# Patient Record
Sex: FEMALE | Race: BLACK OR AFRICAN AMERICAN | NOT HISPANIC OR LATINO | Employment: OTHER | URBAN - METROPOLITAN AREA
[De-identification: names, ages, dates, MRNs, and addresses within clinical notes are randomized per-mention and may not be internally consistent; named-entity substitution may affect disease eponyms.]

---

## 2021-07-16 PROCEDURE — 80047 BASIC METABLC PNL IONIZED CA: CPT

## 2021-07-16 PROCEDURE — 99284 EMERGENCY DEPT VISIT MOD MDM: CPT | Mod: 25

## 2021-07-16 PROCEDURE — 99284 PR EMERGENCY DEPT VISIT,LEVEL IV: ICD-10-PCS | Mod: ,,, | Performed by: EMERGENCY MEDICINE

## 2021-07-16 PROCEDURE — 99284 EMERGENCY DEPT VISIT MOD MDM: CPT | Mod: ,,, | Performed by: EMERGENCY MEDICINE

## 2021-07-17 ENCOUNTER — HOSPITAL ENCOUNTER (EMERGENCY)
Facility: HOSPITAL | Age: 66
Discharge: HOME | End: 2021-07-17
Attending: EMERGENCY MEDICINE
Payer: MEDICARE

## 2021-07-17 VITALS
SYSTOLIC BLOOD PRESSURE: 152 MMHG | HEART RATE: 83 BPM | WEIGHT: 223 LBS | DIASTOLIC BLOOD PRESSURE: 69 MMHG | HEIGHT: 63 IN | OXYGEN SATURATION: 99 % | BODY MASS INDEX: 39.51 KG/M2 | RESPIRATION RATE: 18 BRPM | TEMPERATURE: 99 F

## 2021-07-17 DIAGNOSIS — N30.90 CYSTITIS: ICD-10-CM

## 2021-07-17 DIAGNOSIS — R31.0 GROSS HEMATURIA: Primary | ICD-10-CM

## 2021-07-17 LAB
BACTERIA #/AREA URNS AUTO: ABNORMAL /HPF
BILIRUB UR QL STRIP: NEGATIVE
BUN SERPL-MCNC: 20 MG/DL (ref 6–30)
CHLORIDE SERPL-SCNC: 105 MMOL/L (ref 95–110)
CLARITY UR REFRACT.AUTO: ABNORMAL
COLOR UR AUTO: ABNORMAL
CREAT SERPL-MCNC: 1 MG/DL (ref 0.5–1.4)
GLUCOSE SERPL-MCNC: 122 MG/DL (ref 70–110)
GLUCOSE UR QL STRIP: NEGATIVE
HCT VFR BLD CALC: 38 %PCV (ref 36–54)
HGB UR QL STRIP: ABNORMAL
HYALINE CASTS UR QL AUTO: 0 /LPF
KETONES UR QL STRIP: NEGATIVE
LEUKOCYTE ESTERASE UR QL STRIP: NEGATIVE
MICROSCOPIC COMMENT: ABNORMAL
NITRITE UR QL STRIP: NEGATIVE
PH UR STRIP: 8 [PH] (ref 5–8)
POC IONIZED CALCIUM: 1.2 MMOL/L (ref 1.06–1.42)
POC TCO2 (MEASURED): 24 MMOL/L (ref 23–29)
POTASSIUM BLD-SCNC: 4.8 MMOL/L (ref 3.5–5.1)
PROT UR QL STRIP: ABNORMAL
RBC #/AREA URNS AUTO: >100 /HPF (ref 0–4)
SAMPLE: ABNORMAL
SODIUM BLD-SCNC: 139 MMOL/L (ref 136–145)
SP GR UR STRIP: 1.02 (ref 1–1.03)
SQUAMOUS #/AREA URNS AUTO: 1 /HPF
URN SPEC COLLECT METH UR: ABNORMAL
WBC #/AREA URNS AUTO: 3 /HPF (ref 0–5)

## 2021-07-17 PROCEDURE — 81001 URINALYSIS AUTO W/SCOPE: CPT | Performed by: EMERGENCY MEDICINE

## 2021-07-17 RX ORDER — PANTOPRAZOLE SODIUM 20 MG/1
20 TABLET, DELAYED RELEASE ORAL DAILY
COMMUNITY

## 2021-07-17 RX ORDER — METFORMIN HYDROCHLORIDE 1000 MG/1
1000 TABLET ORAL 2 TIMES DAILY WITH MEALS
COMMUNITY

## 2021-07-17 RX ORDER — HYDROCHLOROTHIAZIDE 25 MG/1
25 TABLET ORAL DAILY
COMMUNITY

## 2021-07-17 RX ORDER — SULFAMETHOXAZOLE AND TRIMETHOPRIM 800; 160 MG/1; MG/1
1 TABLET ORAL 2 TIMES DAILY
Qty: 10 TABLET | Refills: 0 | Status: SHIPPED | OUTPATIENT
Start: 2021-07-17 | End: 2021-07-22

## 2021-07-17 RX ORDER — LISINOPRIL 40 MG/1
40 TABLET ORAL DAILY
COMMUNITY

## 2021-07-17 RX ORDER — ASPIRIN 81 MG/1
81 TABLET ORAL DAILY
COMMUNITY

## 2025-01-09 ENCOUNTER — HOSPITAL ENCOUNTER (EMERGENCY)
Facility: HOSPITAL | Age: 70
Discharge: HOME | End: 2025-01-09
Attending: EMERGENCY MEDICINE
Payer: MEDICARE

## 2025-01-09 VITALS
RESPIRATION RATE: 18 BRPM | WEIGHT: 223 LBS | OXYGEN SATURATION: 99 % | HEIGHT: 63 IN | DIASTOLIC BLOOD PRESSURE: 72 MMHG | SYSTOLIC BLOOD PRESSURE: 176 MMHG | BODY MASS INDEX: 39.51 KG/M2 | HEART RATE: 78 BPM | TEMPERATURE: 98 F

## 2025-01-09 DIAGNOSIS — R07.89 ATYPICAL CHEST PAIN: Primary | ICD-10-CM

## 2025-01-09 DIAGNOSIS — I10 HYPERTENSION, UNSPECIFIED TYPE: ICD-10-CM

## 2025-01-09 DIAGNOSIS — R07.9 CHEST PAIN: ICD-10-CM

## 2025-01-09 LAB
ALBUMIN SERPL BCP-MCNC: 4 G/DL (ref 3.5–5.2)
ALP SERPL-CCNC: 81 U/L (ref 40–150)
ALT SERPL W/O P-5'-P-CCNC: 9 U/L (ref 10–44)
ANION GAP SERPL CALC-SCNC: 10 MMOL/L (ref 8–16)
AST SERPL-CCNC: 14 U/L (ref 10–40)
BASOPHILS # BLD AUTO: 0.03 K/UL (ref 0–0.2)
BASOPHILS NFR BLD: 0.5 % (ref 0–1.9)
BILIRUB SERPL-MCNC: 0.4 MG/DL (ref 0.1–1)
BNP SERPL-MCNC: 20 PG/ML (ref 0–99)
BUN SERPL-MCNC: 17 MG/DL (ref 8–23)
CALCIUM SERPL-MCNC: 9.8 MG/DL (ref 8.7–10.5)
CHLORIDE SERPL-SCNC: 103 MMOL/L (ref 95–110)
CO2 SERPL-SCNC: 24 MMOL/L (ref 23–29)
CREAT SERPL-MCNC: 0.9 MG/DL (ref 0.5–1.4)
D DIMER PPP IA.FEU-MCNC: 0.71 MG/L FEU
DIFFERENTIAL METHOD BLD: ABNORMAL
EOSINOPHIL # BLD AUTO: 0.1 K/UL (ref 0–0.5)
EOSINOPHIL NFR BLD: 2.2 % (ref 0–8)
ERYTHROCYTE [DISTWIDTH] IN BLOOD BY AUTOMATED COUNT: 13.3 % (ref 11.5–14.5)
EST. GFR  (NO RACE VARIABLE): >60 ML/MIN/1.73 M^2
GLUCOSE SERPL-MCNC: 94 MG/DL (ref 70–110)
HCT VFR BLD AUTO: 37.7 % (ref 37–48.5)
HCV AB SERPL QL IA: NORMAL
HGB BLD-MCNC: 11.5 G/DL (ref 12–16)
HIV 1+2 AB+HIV1 P24 AG SERPL QL IA: NORMAL
IMM GRANULOCYTES # BLD AUTO: 0.01 K/UL (ref 0–0.04)
IMM GRANULOCYTES NFR BLD AUTO: 0.2 % (ref 0–0.5)
LYMPHOCYTES # BLD AUTO: 2.5 K/UL (ref 1–4.8)
LYMPHOCYTES NFR BLD: 38.1 % (ref 18–48)
MCH RBC QN AUTO: 26.3 PG (ref 27–31)
MCHC RBC AUTO-ENTMCNC: 30.5 G/DL (ref 32–36)
MCV RBC AUTO: 86 FL (ref 82–98)
MONOCYTES # BLD AUTO: 0.5 K/UL (ref 0.3–1)
MONOCYTES NFR BLD: 7.8 % (ref 4–15)
NEUTROPHILS # BLD AUTO: 3.3 K/UL (ref 1.8–7.7)
NEUTROPHILS NFR BLD: 51.2 % (ref 38–73)
NRBC BLD-RTO: 0 /100 WBC
OHS QRS DURATION: 82 MS
OHS QTC CALCULATION: 404 MS
PLATELET # BLD AUTO: 249 K/UL (ref 150–450)
PMV BLD AUTO: 10.4 FL (ref 9.2–12.9)
POTASSIUM SERPL-SCNC: 4.3 MMOL/L (ref 3.5–5.1)
PROT SERPL-MCNC: 7.6 G/DL (ref 6–8.4)
RBC # BLD AUTO: 4.37 M/UL (ref 4–5.4)
SODIUM SERPL-SCNC: 137 MMOL/L (ref 136–145)
TROPONIN I SERPL DL<=0.01 NG/ML-MCNC: <3 NG/L (ref 0–14)
WBC # BLD AUTO: 6.51 K/UL (ref 3.9–12.7)

## 2025-01-09 PROCEDURE — 80053 COMPREHEN METABOLIC PANEL: CPT | Performed by: EMERGENCY MEDICINE

## 2025-01-09 PROCEDURE — 25000003 PHARM REV CODE 250

## 2025-01-09 PROCEDURE — 94761 N-INVAS EAR/PLS OXIMETRY MLT: CPT

## 2025-01-09 PROCEDURE — 87389 HIV-1 AG W/HIV-1&-2 AB AG IA: CPT | Performed by: PHYSICIAN ASSISTANT

## 2025-01-09 PROCEDURE — 25500020 PHARM REV CODE 255: Performed by: EMERGENCY MEDICINE

## 2025-01-09 PROCEDURE — 84484 ASSAY OF TROPONIN QUANT: CPT | Mod: 91 | Performed by: EMERGENCY MEDICINE

## 2025-01-09 PROCEDURE — 85379 FIBRIN DEGRADATION QUANT: CPT | Performed by: EMERGENCY MEDICINE

## 2025-01-09 PROCEDURE — 84484 ASSAY OF TROPONIN QUANT: CPT | Mod: 91

## 2025-01-09 PROCEDURE — 96374 THER/PROPH/DIAG INJ IV PUSH: CPT

## 2025-01-09 PROCEDURE — 83880 ASSAY OF NATRIURETIC PEPTIDE: CPT | Performed by: EMERGENCY MEDICINE

## 2025-01-09 PROCEDURE — 85025 COMPLETE CBC W/AUTO DIFF WBC: CPT | Performed by: EMERGENCY MEDICINE

## 2025-01-09 PROCEDURE — 93010 ELECTROCARDIOGRAM REPORT: CPT | Mod: ,,, | Performed by: INTERNAL MEDICINE

## 2025-01-09 PROCEDURE — 25000242 PHARM REV CODE 250 ALT 637 W/ HCPCS

## 2025-01-09 PROCEDURE — 93005 ELECTROCARDIOGRAM TRACING: CPT

## 2025-01-09 PROCEDURE — 99285 EMERGENCY DEPT VISIT HI MDM: CPT | Mod: 25

## 2025-01-09 PROCEDURE — 86803 HEPATITIS C AB TEST: CPT | Performed by: PHYSICIAN ASSISTANT

## 2025-01-09 RX ORDER — ALUMINUM HYDROXIDE, MAGNESIUM HYDROXIDE, AND SIMETHICONE 1200; 120; 1200 MG/30ML; MG/30ML; MG/30ML
30 SUSPENSION ORAL
Status: DISCONTINUED | OUTPATIENT
Start: 2025-01-09 | End: 2025-01-09 | Stop reason: HOSPADM

## 2025-01-09 RX ORDER — FAMOTIDINE 10 MG/ML
20 INJECTION INTRAVENOUS
Status: COMPLETED | OUTPATIENT
Start: 2025-01-09 | End: 2025-01-09

## 2025-01-09 RX ORDER — NITROGLYCERIN 0.4 MG/1
0.4 TABLET SUBLINGUAL EVERY 5 MIN PRN
Status: DISCONTINUED | OUTPATIENT
Start: 2025-01-09 | End: 2025-01-09 | Stop reason: HOSPADM

## 2025-01-09 RX ORDER — ASPIRIN 325 MG
325 TABLET ORAL
Status: COMPLETED | OUTPATIENT
Start: 2025-01-09 | End: 2025-01-09

## 2025-01-09 RX ORDER — NITROGLYCERIN 0.4 MG/1
0.4 TABLET SUBLINGUAL
Status: COMPLETED | OUTPATIENT
Start: 2025-01-09 | End: 2025-01-09

## 2025-01-09 RX ADMIN — ASPIRIN 325 MG ORAL TABLET 325 MG: 325 PILL ORAL at 11:01

## 2025-01-09 RX ADMIN — FAMOTIDINE 20 MG: 10 INJECTION INTRAVENOUS at 11:01

## 2025-01-09 RX ADMIN — IOHEXOL 100 ML: 350 INJECTION, SOLUTION INTRAVENOUS at 02:01

## 2025-01-09 RX ADMIN — NITROGLYCERIN 0.4 MG: 0.4 TABLET, ORALLY DISINTEGRATING SUBLINGUAL at 11:01

## 2025-01-09 NOTE — DISCHARGE INSTRUCTIONS
We evaluated in the emergency department for your chest pain.  Your cardiac labs are reassuring at this time.  We did place referral for you to see Cardiology outpatient.  They will call your phone number.  If you do not hear from them, call the circled number attached.     Please return to the emergency department for chest pain that has not relieved with the liver counter medications or your GI medications.

## 2025-01-09 NOTE — PROVIDER PROGRESS NOTES - EMERGENCY DEPT.
ED Attending Hand-off Note    I have discussed the patient's history and presentation in the ED with Kvng Jerome MD    Brief H&P: Patient is a 69 y.o. female who presented to the ED with Chest Pain (Chest pain x2 weeks. Hypertensive in triage SBP 230s)        Pending studies and consultations: Labs    Disposition:  Will continue to monitor, update patient on results of testing and determine appropriate additional treatment for the duration of stay in ED.  Zackary Chappell DO 2:16 PM 1/9/2025        UPDATES:   ED Course as of 01/09/25 1518   Thu Jan 09, 2025   1028 EKG no STEMI [RT]   1141 BP(!): 231/104 [AB]   1226 WBC: 6.51  No leukocytosis  [AB]   1255 Troponin I High Sensitivity: <3 [AB]   1512 Troponin I High Sensitivity  2 troponins greater than 2 hours apart has been obtain during ED course.  According to her HEAR score, given that she has delta troponin within normal limits patient can follow up with Cardiology outpatient for further workup of her symptoms today. [TK]   1513 CTA Chest Non-Coronary (PE Studies)  No evidence of pulmonary embolism [TK]      ED Course User Index  [AB] Kvng Jerome MD  [RT] Sander Garcia MD  [TK] Zackary Chappell DO     At this time given normal troponins after delta troponin greater than 2 hours we will plan for discharge and follow up in Cardiology Clinic.  Shared decision-making at this time regarding this plan.  Patient is preferring follow up with Cardiology outpatient.  No other concerns at this time.  ER return precautions given.        Clinical Impression  :  Chest pain  Atypical chest pain (Primary)       ED Disposition Condition    Discharge Stable          STAFF ATTENDING PHYSICIAN NOTE:  I have individually/jointly evaluated Alexa Terry and discussed their ED management with the resident physician. I have also reviewed their notes, assessments, and procedures documented.  I was present during all critical portions of any procedure(s) performed on Alexa GRACE  Harrison.   ____________________  Esvin MONICA Resendiz MD, FAAEM  Emergency Medicine Staff

## 2025-01-09 NOTE — ED PROVIDER NOTES
Encounter Date: 1/9/2025       History     Chief Complaint   Patient presents with    Chest Pain     Chest pain x2 weeks. Hypertensive in triage SBP 230s     The history is provided by the patient and medical records. No  was used.     Patient is a 69-year-old female with a past medical history of diabetes, hypertension, anemia who presents due to chest pain.  She notes that it started approximately 2-3 weeks ago and has been somewhat persistent/getting worse since that time.  She notes that it was an 8/10 yesterday evening and prevented her from sleep which is what prompted her to come to the emergency department.  She notes that it radiates to the back to a certain degree.  Notes that she had an episode like this 8 years ago, at which time she was given nitroglycerin which improved the pain and subsequently admitted to the hospital.  Has not had any shortness of breath.  Has taken some Tylenol at home which she states moderately helps.  Denies all other symptoms at this time.    Review of patient's allergies indicates:   Allergen Reactions    Ibuprofen      Upset stomach     Past Medical History:   Diagnosis Date    Anemia     Arthritis     both knees    Diabetes mellitus     GERD (gastroesophageal reflux disease)     Hypertension      History reviewed. No pertinent surgical history.  No family history on file.  Social History     Tobacco Use    Smoking status: Never   Substance Use Topics    Alcohol use: Never    Drug use: Never       Physical Exam     Initial Vitals   BP Pulse Resp Temp SpO2   01/09/25 1024 01/09/25 1022 01/09/25 1022 01/09/25 1022 01/09/25 1022   (!) 231/104 94 18 97.5 °F (36.4 °C) 99 %      MAP       --                Physical Exam    Nursing note and vitals reviewed.  Constitutional: She appears well-developed. No distress.   HENT:   Head: Normocephalic and atraumatic. Mouth/Throat: Oropharynx is clear and moist and mucous membranes are normal.   Eyes: EOM are normal.  Pupils are equal, round, and reactive to light.   Neck:   Normal range of motion.  Cardiovascular:  Normal rate, regular rhythm and normal heart sounds.           No murmur heard.  Pulmonary/Chest: Breath sounds normal. No respiratory distress. She has no wheezes. She has no rhonchi. She has no rales.   Abdominal: Abdomen is soft. She exhibits no distension. There is no abdominal tenderness. There is no rebound and no guarding.   Musculoskeletal:      Cervical back: Normal range of motion.      Comments: 1+ pitting edema noted in the bilateral lower extremities     Neurological: She is alert and oriented to person, place, and time.   Skin: Skin is warm.   Psychiatric: She has a normal mood and affect. Her behavior is normal. Thought content normal.         ED Course   Procedures  Labs Reviewed   CBC W/ AUTO DIFFERENTIAL - Abnormal       Result Value    WBC 6.51      RBC 4.37      Hemoglobin 11.5 (*)     Hematocrit 37.7      MCV 86      MCH 26.3 (*)     MCHC 30.5 (*)     RDW 13.3      Platelets 249      MPV 10.4      Immature Granulocytes 0.2      Gran # (ANC) 3.3      Immature Grans (Abs) 0.01      Lymph # 2.5      Mono # 0.5      Eos # 0.1      Baso # 0.03      nRBC 0      Gran % 51.2      Lymph % 38.1      Mono % 7.8      Eosinophil % 2.2      Basophil % 0.5      Differential Method Automated      Narrative:     Release to patient->Immediate   COMPREHENSIVE METABOLIC PANEL - Abnormal    Sodium 137      Potassium 4.3      Chloride 103      CO2 24      Glucose 94      BUN 17      Creatinine 0.9      Calcium 9.8      Total Protein 7.6      Albumin 4.0      Total Bilirubin 0.4      Alkaline Phosphatase 81      AST 14      ALT 9 (*)     eGFR >60.0      Anion Gap 10      Narrative:     Release to patient->Immediate   D DIMER, QUANTITATIVE - Abnormal    D-Dimer 0.71 (*)    HEPATITIS C ANTIBODY    Hepatitis C Ab Non-reactive      Narrative:     Release to patient->Immediate   HIV 1 / 2 ANTIBODY    HIV 1/2 Ag/Ab  Non-reactive      Narrative:     Release to patient->Immediate   TROPONIN I HIGH SENSITIVITY    Troponin I High Sensitivity <3      Narrative:     Release to patient->Immediate   TROPONIN I HIGH SENSITIVITY    Troponin I High Sensitivity <3     B-TYPE NATRIURETIC PEPTIDE    BNP 20      Narrative:     Release to patient->Immediate   TROPONIN I HIGH SENSITIVITY    Troponin I High Sensitivity <3       EKG Readings: (Independently Interpreted)   Initial Reading: No STEMI. Rhythm: Normal Sinus Rhythm. Heart Rate: 81. Ectopy: No Ectopy. Clinical Impression: Normal Sinus Rhythm   No prior for comparison     ECG Results              EKG 12-lead (Final result)        Collection Time Result Time QRS Duration OHS QTC Calculation    01/09/25 10:23:42 01/09/25 11:15:48 82 404                     Final result by Interface, Lab In [facility] (01/09/25 11:15:55)                   Narrative:    Test Reason : R07.9,    Vent. Rate :  81 BPM     Atrial Rate :  81 BPM     P-R Int : 148 ms          QRS Dur :  82 ms      QT Int : 348 ms       P-R-T Axes :  37 -14  14 degrees    QTcB Int : 404 ms    Normal sinus rhythm  Low septal forces ; Abnormal R wave progression in the precordial leads  -Cannot exclude  Anterior infarct ,age undetermined  Abnormal ECG  No previous ECGs available  Confirmed by Alex Hui ([id number]) on 1/9/2025 11:15:47 AM    Referred By:            Confirmed By: Alex Hui                                  Imaging Results              CTA Chest Non-Coronary (PE Studies) (Final result)  Result time 01/09/25 14:51:32      Final result by Carlos Holley MD (01/09/25 14:51:32)                   Impression:      1. No pulmonary embolism is identified.  2. No evidence of acute pulmonary process.  3. Small sliding hiatal hernia.      Electronically signed by: Carlos Lomas  Date:    01/09/2025  Time:    14:51               Narrative:    EXAMINATION:  CTA CHEST NON CORONARY (PE STUDIES)    CLINICAL  HISTORY:  Pulmonary embolism (PE) suspected, positive D-dimer;Chest pain, radiating to the back.  Positive D-dimer;    TECHNIQUE:  CTA of the chest was obtained. Images were reformatted and reviewed in coronal and sagittal planes. Volume-rendered 3D reconstructed images were acquired by post processing for a better visualization of the vascular anomalies, on an independent [employer] workstation with concurrent supervision and archived for permanent records.  Images were acquired during the arterial phase after 100 cc of non ionic intravenous contrast administration.    Suboptimal assessment of the abdominal solid organs due to lack of a venous phase.    COMPARISON:  None.    FINDINGS:  Diagnostic quality: Adequate.    Pulmonary arteries: The pulmonary arteries are well visualized through the segmental level. No pulmonary embolism is identified.    Right heart strain: None.    Chest wall and lower neck: Questionable left thyroid nodule    Lungs and pleura: Hypoventilatory changes.  No consolidation or opacities.    Mediastinum and wendy: No mediastinal or hilar adenopathy.    Vessels: Mild atherosclerotic changes in the aorta and coronary arteries.    Heart and pericardium: Heart size is normal. No pericardial effusion.    Upper abdomen: Small sliding hernia.  Small gallbladder sludge.    Bones: Moderate prominent anterior bridging osteophytes in the thoracic spine.                                       X-Ray Chest AP Portable (Final result)  Result time 01/09/25 12:40:31      Final result by Omi Nagy MD (01/09/25 12:40:31)                   Impression:      No significant intrathoracic abnormality referable to the current history of chest pain is appreciated.      Electronically signed by: Omi Nagy MD  Date:    01/09/2025  Time:    12:40               Narrative:    EXAMINATION:  XR CHEST AP PORTABLE    TECHNIQUE:  One view    COMPARISON:  No prior chest radiographs are currently available for comparison purposes.   Clinical information of chest pain.    FINDINGS:  Allowing for magnification of the cardiomediastinal silhouette related to projection, the true cardiac size is close to the upper limit of normal.  Pulmonary vascularity is normal, and there are no findings indicating current cardiac decompensation.  Lung zones are clear, and are free of significant airspace consolidation or volume loss.  No pleural fluid.  Some tortuosity of the thoracic aorta is noted, but no hilar or mediastinal mass lesion is appreciated.  No pneumothorax.                                       Medications   nitroGLYCERIN SL tablet 0.4 mg (has no administration in time range)   aluminum & magnesium hydroxide-simethicone 400-400-40 mg/5 mL suspension 30 mL (has no administration in time range)   nitroGLYCERIN SL tablet 0.4 mg (0.4 mg Sublingual Given 1/9/25 1150)   famotidine (PF) injection 20 mg (20 mg Intravenous Given 1/9/25 1148)   aspirin tablet 325 mg (325 mg Oral Given 1/9/25 1148)   iohexoL (OMNIPAQUE 350) injection 100 mL (100 mLs Intravenous Given 1/9/25 1426)     Medical Decision Making  Patient is a 69-year-old female who presents due to concerns of chest pain.  Differential diagnosis includes but is not limited to ACS, GERD, PE, pneumothorax, pneumonia, PE, pericarditis, aortic dissection, AAA.  Patient noted to be hypertensive on initial presentation but otherwise had reassuring vital signs.  She endorses that she did take her home blood pressure medications.  In the setting of her being hypertensive as well as having chest pain and an EKG that does not show an inferior STEMI, will give a dose of nitroglycerin for symptomatic control.  We will also perform a cardiac workup and get a D-dimer.    D-dimer found to be slightly elevated.  In the setting of her risk factors, we will get a CTA to further evaluate.  Laboratory workup shown to otherwise be unremarkable.  Upon my re-evaluation of the patient, her blood pressure had improved as  did her pain.      Patient is signed out pending the results of her CTA as well as a 2nd troponin.  Disposition based on the results of the studies.    Amount and/or Complexity of Data Reviewed  External Data Reviewed: notes.     Details: No prior ECHO or stress test noted on record review.   Labs: ordered. Decision-making details documented in ED Course.  Radiology: ordered and independent interpretation performed. Decision-making details documented in ED Course.     Details: No acute CXR findings   ECG/medicine tests: ordered and independent interpretation performed.    Risk  OTC drugs.  Prescription drug management.              Attending Attestation:   Physician Attestation Statement for Resident:  As the supervising MD   Physician Attestation Statement: I have personally seen and examined this patient.   I agree with the above history.  -: Emergent evaluation of a 70 yo female presenting with chest pain.  States the pain is in her anterior chest and sometimes radiating to her upper back.  Denies injury.  Pain is intermittent, has been ongoing for the past couple weeks, but more persistent, especially last night, prompting her visit to the ED.     As the supervising MD I agree with the above PE.   -: Afebrile, no distress  Non-tender chest wall, no overlying vesicular rash  Intact and equal radial pulses  No calf pain  RRR, lungs clear no respiratory distress    As the supervising MD I agree with the above treatment, course, plan, and disposition.   -: Ordered for delta HS troponins.   D-dimer elevated, will pursue CTA.   BP and pain improved with nitroglycerin.   Signed out to oncoming team at 2p pending CTA.  No prior provocative cardiac testing on record review, would consider admission for blood pressure control/provocative cardiac testing.   Will need Cardiology f/u outpatient.    I have reviewed and agree with the residents interpretation of the following: lab data, x-rays, CT scans and EKG.  I have  reviewed the following: old records at this facility.                ED Course as of 01/09/25 1604   Thu Jan 09, 2025   1028 EKG no STEMI [RT]   1141 BP(!): 231/104 [AB]   1226 WBC: 6.51  No leukocytosis  [AB]   1255 Troponin I High Sensitivity: <3 [AB]   1512 Troponin I High Sensitivity  2 troponins greater than 2 hours apart has been obtain during ED course.  According to her HEAR score, given that she has delta troponin within normal limits patient can follow up with Cardiology outpatient for further workup of her symptoms today. [TK]   1513 CTA Chest Non-Coronary (PE Studies)  No evidence of pulmonary embolism [TK]      ED Course User Index  [AB] Kvng Jerome MD  [RT] Sander Garcia MD  [TK] Zackary Chappell DO                           Clinical Impression:  Final diagnoses:  [R07.9] Chest pain  [R07.89] Atypical chest pain (Primary)  [I10] Hypertension, unspecified type                 Ulises Boyer MD  Resident  01/09/25 1455       Kvng Jerome MD  01/09/25 1610

## 2025-01-09 NOTE — ED TRIAGE NOTES
Alexa Terry, a 69 y.o. female presents to the ED w/ complaint of intermittent chest pain x 2 weeks. Pt hypertensive in ED, pt reports taking HTN medications. AAOx4, ambulatory.

## 2025-01-18 ENCOUNTER — HOSPITAL ENCOUNTER (EMERGENCY)
Facility: HOSPITAL | Age: 70
Discharge: HOME | End: 2025-01-18
Attending: EMERGENCY MEDICINE
Payer: MEDICARE

## 2025-01-18 VITALS
OXYGEN SATURATION: 100 % | WEIGHT: 223.13 LBS | RESPIRATION RATE: 17 BRPM | HEART RATE: 87 BPM | TEMPERATURE: 98 F | DIASTOLIC BLOOD PRESSURE: 94 MMHG | BODY MASS INDEX: 39.54 KG/M2 | SYSTOLIC BLOOD PRESSURE: 188 MMHG | HEIGHT: 63 IN

## 2025-01-18 DIAGNOSIS — B02.9 HERPES ZOSTER WITHOUT COMPLICATION: Primary | ICD-10-CM

## 2025-01-18 PROCEDURE — 99283 EMERGENCY DEPT VISIT LOW MDM: CPT

## 2025-01-18 RX ORDER — VALACYCLOVIR HYDROCHLORIDE 1 G/1
1000 TABLET, FILM COATED ORAL 3 TIMES DAILY
Qty: 21 TABLET | Refills: 0 | Status: SHIPPED | OUTPATIENT
Start: 2025-01-18 | End: 2025-01-25

## 2025-01-19 NOTE — ED PROVIDER NOTES
Encounter Date: 1/18/2025       History     Chief Complaint   Patient presents with    Rash     Chest, back and right arm area for 1 week now     69-year-old female with history of anemia, DM, GERD, hypertension presents to the emergency department with chief complaint of rash. States that it started about one week ago. It started two days after she was discharged from the ED for chest pain workup. Had labs, CTA at that time. States that she had some burning on her chest prior to symptom onset. States the rash is very itchy. Denies drainage. No new products. Tried using cream on the rash without relief. Denies worsening or alleviating factors.         Review of patient's allergies indicates:   Allergen Reactions    Ibuprofen      Upset stomach     Past Medical History:   Diagnosis Date    Anemia     Arthritis     both knees    Diabetes mellitus     GERD (gastroesophageal reflux disease)     Hypertension      History reviewed. No pertinent surgical history.  No family history on file.  Social History     Tobacco Use    Smoking status: Never   Substance Use Topics    Alcohol use: Never    Drug use: Never     Review of Systems   Skin:  Positive for rash.       Physical Exam     Initial Vitals [01/18/25 2205]   BP Pulse Resp Temp SpO2   (!) 188/94 87 17 98.4 °F (36.9 °C) 100 %      MAP       --         Physical Exam    Vitals reviewed.  Constitutional: She appears well-developed and well-nourished. She is not diaphoretic. No distress.   HENT:   Head: Normocephalic and atraumatic. Mouth/Throat: Oropharynx is clear and moist.   Eyes: Conjunctivae and EOM are normal. Pupils are equal, round, and reactive to light.   Neck: Neck supple.   Normal range of motion.  Cardiovascular:  Normal rate, regular rhythm, normal heart sounds and intact distal pulses.     Exam reveals no gallop and no friction rub.       No murmur heard.  Pulmonary/Chest: Breath sounds normal. She has no wheezes. She has no rhonchi. She has no rales.    Abdominal: Abdomen is soft. Bowel sounds are normal. There is no abdominal tenderness.   Musculoskeletal:         General: Normal range of motion.      Cervical back: Normal range of motion and neck supple.     Neurological: She is alert and oriented to person, place, and time. She has normal strength. No cranial nerve deficit or sensory deficit. GCS score is 15. GCS eye subscore is 4. GCS verbal subscore is 5. GCS motor subscore is 6.   Skin: Skin is warm and dry. Capillary refill takes less than 2 seconds.   Clusters of ruptured appearing vesicles with erythematous base noted to chest, right arm, back    Psychiatric: She has a normal mood and affect. Her behavior is normal. Judgment and thought content normal.         ED Course   Procedures  Labs Reviewed - No data to display       Imaging Results    None          Medications - No data to display  Medical Decision Making  Emergent evaluation of a 69 y.o. female presenting to the emergency department complaining of rash that started one week ago. Preceded by burning in her chest. Patient is afebrile, hemodynamically stable, and non toxic appearing.     Differential diagnosis includes but is not limited to herpes zoster, psoriasis, dermatitis.     Patient presentation most consistent with herpes zoster.  Will treat with valacyclovir.  She has a follow-up appointment with her PCP in a few days.  Advised to keep this appointment.  She may need Dermatology follow-up if not improved.  She does not have significant pain to the area, but does have itchiness.  She states that she was using a cream that was prescribed to her by her PCP without much improvement.  She recently picked up another cream that was over-the-counter states that she has been using this with some relief of her itchiness.  Return precautions discussed.  Patient voices understanding.  All questions answered.  The patient was instructed to follow up with a primary care provider or to return to the  emergency department for worsening symptoms. The treatment plan was discussed with the patient who demonstrated understanding and comfort with plan.       Risk  Prescription drug management.               ED Course as of 01/19/25 0126   Sun Jan 19, 2025 0125 Attestation of Dr. Garcia (Attending Physician):      I have reviewed the record and the patient care provided by the JEYSON and agree with the documented HPI, ROS, PE.  I also agree with the treatment plan and work up and I have performed an independent history / physical exam and MDM.     [RT]   0125 Consider possible shingles versus pustular psoriasis.  However with shingles and consideration we will start on antivirals.  Advised to follow up with the primary care doctor for further evaluation and dermatology.  Patient agrees with treatment and plan.  Patient is otherwise well-appearing. [RT]      ED Course User Index  [RT] Sander Garcia MD                           Clinical Impression:  Final diagnoses:  [B02.9] Herpes zoster without complication (Primary)          ED Disposition Condition    Discharge Stable          ED Prescriptions       Medication Sig Dispense Start Date End Date Auth. Provider    valACYclovir (VALTREX) 1000 MG tablet Take 1 tablet (1,000 mg total) by mouth 3 (three) times daily. for 7 days 21 tablet 1/18/2025 1/25/2025 Peggy Rodriguez PA-C          Follow-up Information       Follow up With Specialties Details Why Contact Info    [facility] - Emergency Dept Emergency Medicine Go to  If symptoms worsen [id number]  [facility] [id number] [phone]    [place], [place] [facility], Psychiatry, Family Medicine Schedule an appointment as soon as possible for a visit   [address] [facility] [address]  [phone]               Peggy Rodriguez PAJayantC  01/18/25 6019       Sander Garcia MD  01/19/25 0126

## 2025-01-19 NOTE — DISCHARGE INSTRUCTIONS
Start taking Valtrex as prescribed.  Follow-up with your primary doctor or return to the emergency department sooner for any new or worsening symptoms.

## 2025-04-04 ENCOUNTER — TELEPHONE (OUTPATIENT)
Dept: CARDIOLOGY | Facility: CLINIC | Age: 70
End: 2025-04-04
Payer: MEDICARE